# Patient Record
Sex: FEMALE | Race: WHITE
[De-identification: names, ages, dates, MRNs, and addresses within clinical notes are randomized per-mention and may not be internally consistent; named-entity substitution may affect disease eponyms.]

---

## 2018-08-04 ENCOUNTER — HOSPITAL ENCOUNTER (EMERGENCY)
Dept: HOSPITAL 25 - UCEAST | Age: 28
Discharge: HOME | End: 2018-08-04
Payer: COMMERCIAL

## 2018-08-04 VITALS — SYSTOLIC BLOOD PRESSURE: 110 MMHG | DIASTOLIC BLOOD PRESSURE: 69 MMHG

## 2018-08-04 DIAGNOSIS — E86.0: ICD-10-CM

## 2018-08-04 DIAGNOSIS — Z88.1: ICD-10-CM

## 2018-08-04 DIAGNOSIS — A04.5: ICD-10-CM

## 2018-08-04 DIAGNOSIS — R51: Primary | ICD-10-CM

## 2018-08-04 PROCEDURE — 87046 STOOL CULTR AEROBIC BACT EA: CPT

## 2018-08-04 PROCEDURE — 87651 STREP A DNA AMP PROBE: CPT

## 2018-08-04 PROCEDURE — 87086 URINE CULTURE/COLONY COUNT: CPT

## 2018-08-04 PROCEDURE — 96372 THER/PROPH/DIAG INJ SC/IM: CPT

## 2018-08-04 PROCEDURE — 87077 CULTURE AEROBIC IDENTIFY: CPT

## 2018-08-04 PROCEDURE — 87329 GIARDIA AG IA: CPT

## 2018-08-04 PROCEDURE — 87045 FECES CULTURE AEROBIC BACT: CPT

## 2018-08-04 PROCEDURE — G0463 HOSPITAL OUTPT CLINIC VISIT: HCPCS

## 2018-08-04 PROCEDURE — 99212 OFFICE O/P EST SF 10 MIN: CPT

## 2018-08-04 PROCEDURE — 81003 URINALYSIS AUTO W/O SCOPE: CPT

## 2018-08-04 PROCEDURE — 87328 CRYPTOSPORIDIUM AG IA: CPT

## 2018-08-04 PROCEDURE — 87899 AGENT NOS ASSAY W/OPTIC: CPT

## 2018-08-04 NOTE — UC
HPI Febrile Illness





- HPI Summary


HPI Summary: 


This is scribe Lydia Mccray documenting for attending Sukumar Dee M.D.





Pt is a 28 y/o F who presents to [facility] c/o fever since yesterday. Fever 

started yesterday after getting home from work, with her temperature 101 last 

night. Throughout the night her temperature reached 104, and this morning it 

was 102. She took 2 regular strength Motrin this morning at 0930 and on arrival 

to [facility], her temperature was 99.2. Additionally c/o HA last night and 

diarrhea and neck pain since this morning. Currently denies sore throat, noting 

one last week. Denies cough, neck stiffness, photophobia. Notes that last week 

she had a URI. Reports that she rarely experiences headaches, with no PMHx of 

migraines. 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 

sick calves and goats and that she may have been exposed to cryptosporidiosis. 








- History of Current Complaint


Chief Complaint: UCGeneralIllness


Time Seen by Provider: 08/04/18 10:14


Hx Obtained From: Patient


Hx Last Menstrual Period: 7/27/18


Onset/Duration: Started Hours Ago - Last night, Still Present


Current Severity: Moderate


Pain Intensity: 5 - Head


Pain Scale Used: 0-10 Numeric


Aggravating Factors: Nothing


Alleviating Factors: Nothing


Associated Signs and Symptoms: Diarrhea, Headache





- Allergy/Home Medications


Allergies/Adverse Reactions: 


 Allergies











Allergy/AdvReac Type Severity Reaction Status Date / Time


 


azithromycin [From Zithromax] Allergy  Rash Verified 08/04/18 10:08











Home Medications: 


 Home Medications





Iron 45 mg PO DAILY 08/04/18 [History Confirmed 08/04/18]


Vit D3/Folic Acid/B2/B6/B12 [Folgard Tablet] 1 tab PO DAILY 08/04/18 [History 

Confirmed 08/04/18]











PMH/Surg Hx/FS Hx/Imm Hx





- Additional Past Medical History


Additional PMH: 


PMHx: anemia


Negative PMHx: migraines





- Surgical History


Surgery Procedure, Year, and Place: knee surg





- Family History


Known Family History: Positive: Cardiac Disease, Hypertension, Diabetes





- Social History


Occupation: Student


Alcohol Use: None


Substance Use Type: None


Smoking Status (MU): Never Smoked Tobacco





Review of Systems


Constitutional: Fever


Skin: Negative


Eyes: Negative


ENT: Negative


Respiratory: Negative


Cardiovascular: Negative


Gastrointestinal: Diarrhea


Genitourinary: Negative


Motor: Negative


Neurovascular: Negative


Musculoskeletal: Other: - Neck pain


Neurological: Headache


Psychological: Negative


All Other Systems Reviewed And Are Negative: Yes





- Comments


Additional Review of Systems Comments: 


NEGATIVE: Cough, neck stiffness, photophobia 





Physical Exam





- Summary


Physical Exam Summary: 


VITAL SIGNS: Reviewed.


GENERAL: Patient is a well-developed and nourished female who is lying 

comfortable in the stretcher. Patient is not in any acute respiratory distress.


HEAD AND FACE: Normocephalic


EYES: PERRLA, EOMI x 2.


EARS: Hearing grossly intact.


MOUTH: Oropharynx within normal limits.


NECK: Supple, trachea is midline, no adenopathy, no JVD, no carotid bruit, no 

neck stiffness, and no meningeal signs


CHEST: Symmetric, no tenderness at palpation


LUNGS: Clear to auscultation bilaterally. No wheezing or crackles.


CVS: Regular rate and rhythm, S1 and S2 present, no murmurs or gallops 

appreciated.


ABDOMEN: Soft, non-tender. Bowel sounds are normal. No abdominal abnormal 

pulsations.


EXTREMITIES: Full ROM in all major joints, no edema, no cyanosis or clubbing.


NEURO: Alert and oriented x 3. No acute neurological deficits. Speech is normal 

and follows commands.


SKIN: Dry and warm


Triage Information Reviewed: Yes


Vital Signs: 


 Initial Vital Signs











Temp  99.3 F   08/04/18 10:04


 


Pulse  110   08/04/18 10:04


 


Resp  18   08/04/18 10:04


 


BP  110/69   08/04/18 10:04


 


Pulse Ox  100   08/04/18 10:04











Vital Signs Reviewed: Yes





Re-Evaluation





- Re-Evaluation


  ** First Eval


Re-Evaluation Time: 10:59


Comment: Discussed results and return precautions.





Course/Dx





- Course


Assessment/Plan: Patient is a 27-year-old female who presents to the urgent 

care with a chief complaint of having headache, fevers, and diarrhea.  Patient 

reports that she is a  student and she wants to be tested for 

cryptosporidium.  The patient doesn't have any neck pain, photophobia or stiff 

neck.  In the physical exam doesn't show any meningeal signs.  Urinalysis shows 

positive leukocytes and may be the cause of the fever.  Therefore the patient 

was placed on ciprofloxacin.  The patient was not able to give a stool sample 

in the urgent care therefore she was given the containers to bring back a stool 

sample.  She was given instructions that if she continues to have HA, fevers 

she should go to the emergency room for further workup and management and 

possibly rule out meningitis.  The patient is not ill looking or toxic looking 

therefore the patient will be discharged home with follow-up with PCP.  She is 

hemodynamically stable alert and oriented 3.  Cosleep





- Diagnoses


Clinic Provider Diagnoses: Headache, diarrhea, fever, dehydration, possible UTI





Discharge





- Sign-Out/Discharge


Documenting (check all that apply): Patient Departure - Discharge





- Discharge Plan


Condition: Stable


Disposition: HOME


Prescriptions: 


Ciprofloxacin TAB* [Cipro 250 MG Tab*] 250 mg PO BID #6 tab


Patient Education Materials:  Dehydration (ED), Urinary Tract Infection in 

Women (ED), Fever in Adults (ED), Acute Headache (ED), Acute Diarrhea (ED)


Referrals: 


[facility] PHYSICIAN REFERRAL [Outside]


No Primary Care Phys,NOPCP [Primary Care Provider] - 


Additional Instructions: 


RETURN TO ED OR URGENT CARE FOR ANY NEW OR WORSENING EXPOSURE.





- Billing Disposition and Condition


Condition: STABLE


Disposition: Home

## 2018-08-05 ENCOUNTER — HOSPITAL ENCOUNTER (EMERGENCY)
Dept: HOSPITAL 25 - ED | Age: 28
Discharge: HOME | End: 2018-08-05
Payer: COMMERCIAL

## 2018-08-05 VITALS — SYSTOLIC BLOOD PRESSURE: 99 MMHG | DIASTOLIC BLOOD PRESSURE: 69 MMHG

## 2018-08-05 DIAGNOSIS — N39.0: ICD-10-CM

## 2018-08-05 DIAGNOSIS — Z88.3: ICD-10-CM

## 2018-08-05 DIAGNOSIS — M79.1: Primary | ICD-10-CM

## 2018-08-05 PROCEDURE — 99282 EMERGENCY DEPT VISIT SF MDM: CPT

## 2018-08-05 PROCEDURE — 96361 HYDRATE IV INFUSION ADD-ON: CPT

## 2018-08-05 PROCEDURE — 96375 TX/PRO/DX INJ NEW DRUG ADDON: CPT

## 2018-08-05 PROCEDURE — 96365 THER/PROPH/DIAG IV INF INIT: CPT

## 2018-08-05 NOTE — ED
Neck Pain





- HPI Summary


HPI Summary: 





This is ana maria Ruiz documenting for attending Ronaldo Powers MD.


This patient is a 27 year old F presenting to [facility] c/o of neck stiffness that 

began today. Pt was seen at the  yesterday and dx with UTI and given Cipro 

for sx that began 8-3-18. They told her that if she develops neck stiffness to 

report to the ED. The patient rates the pain 4/10 in severity. Patient reports 

headache and suprapubic pain. Patient denies urinary sx. Pt states she did have 

a fever that resolved, she also had a headache for the last two days that 

resolved this morning. 








- History of Current Complaint


Chief Complaint: EDNeckComplaint


Stated Complaint: NECK PAIN


Time Seen by Provider: 08/05/18 14:53


Hx Obtained From: Patient


Onset/Duration Of Injury/Symptoms: Hours


Timing: Constant


Onset/Duration: Gradual Onset, Started hours ago, Still Present


Severity Initially: Mild


Severity Currently: Mild


Pain Intensity: 4


Pain Scale Used: 0-10 Numeric


Associated Signs & Symptoms: Positive: Negative - urinary sx., Fever - that 

resolved, Headache - that resolved





- Allergies/Home Medications


Allergies/Adverse Reactions: 


 Allergies











Allergy/AdvReac Type Severity Reaction Status Date / Time


 


azithromycin [From Zithromax] Allergy  Rash Verified 08/05/18 14:22











Home Medications: 


 Home Medications





Ciprofloxacin TAB* [Cipro 250 MG Tab*] 250 mg PO BID 08/05/18 [History 

Confirmed 08/05/18]


Cyanocobalamin TAB* [Vitamin B12 TAB*] 1,000 mcg PO DAILY 08/05/18 [History 

Confirmed 08/05/18]


Ibuprofen TAB* [Motrin TAB* 400 MG] 400 mg PO Q6H PRN 08/05/18 [History 

Confirmed 08/05/18]


Iron 45 mg PO DAILY 08/05/18 [History Confirmed 08/05/18]


Vitamin D3 5,000 unit PO DAILY 08/05/18 [History Confirmed 08/05/18]











PMH/Surg Hx/FS Hx/Imm Hx


Endocrine/Hematology History: 


   Denies: Hx Blood Disorders, Hx Diabetes, Hx Sickle Cell Disease


Cardiovascular History: 


   Denies: Hx Congenital Heart Disease, Hx Coronary Artery Disease


Respiratory History: 


   Denies: Hx Chronic Bronchitis, Hx Lung Cancer, Hx Pulmonary Embolism


GI History: 


   Denies: Hx Gall Bladder Disease, Hx Gastrointestinal Bleed


 History: Reports: Other  Problems/Disorders - UTI's 


Sensory History: Reports: Hx Contacts or Glasses


Opthamlomology History: Reports: Hx Contacts or Glasses


EENT History: 


   Denies: Hx Deafness


Neurological History: 


   Denies: Hx CVA, Hx Dementia


Infectious Disease History: No


Infectious Disease History: 


   Denies: Traveled Outside the [place] in Last 30 Days





- Family History


Known Family History: 


   Negative: Renal Disease, Respiratory Disease, Seizure Disorder





- Social History


Occupation: Student


Alcohol Use: None


Substance Use Type: Reports: None


Smoking Status (MU): Never Smoked Tobacco





Review of Systems


Positive: Fever - resolved 


Positive: Abdominal Pain


Positive: no symptoms reported


Positive: Other - neck pain 


Positive: Headache - resolved 


All Other Systems Reviewed And Are Negative: Yes





Physical Exam





- Summary


Physical Exam Summary: 








Appearance: Well appearing, no pain distress


Skin: warm, dry, reflects adequate perfusion


Head/face: normal


Eyes: EOMI, BIANCA


ENT: normal


Neck: supple, non-tender, no rigidity, no meningeal signs 


Respiratory: CTA, breath sounds present


Cardiovascular: RRR, pulses symmetrical 


Abdomen: non-tender, soft


Bowel Sounds: present


Musculoskeletal: normal, strength/ROM intact


Neuro: normal, sensory motor intact, A&Ox3 





Triage Information Reviewed: Yes


Vital Signs On Initial Exam: 


 Initial Vitals











Temp Pulse Resp BP Pulse Ox


 


 98.4 F   88   16   115/79   97 


 


 08/05/18 14:19  08/05/18 14:19  08/05/18 14:19  08/05/18 14:19  08/05/18 14:19











Vital Signs Reviewed: Yes





Diagnostics





- Vital Signs


 Vital Signs











  Temp Pulse Resp BP Pulse Ox


 


 08/05/18 14:42   89   112/68  99


 


 08/05/18 14:19  98.4 F  88  16  115/79  97














- Laboratory


Lab Statement: Any lab studies that have been ordered have been reviewed, and 

results considered in the medical decision making process.





Neck Course/Dx





- Course


Course Of Treatment: Patient was seen and treated for urinary tract infection 

yesterday and started on antibiotics.  She has no fever today but some minor 

neck discomfort.  There is no meningismus, rigidity.  There is negative Kernig 

and Brudzinski sign.  She ranges it freely and is very comfortable appearing.  

I did hydrate her, give Toradol and an additional gram of Rocephin for her 

likely urinary tract infection source of her discomfort.  She'll continue 

outpatient antibiotics and will follow-up with [facility].





- Diagnoses


Provider Diagnoses: 


 Myalgia, UTI (urinary tract infection)








Discharge





- Sign-Out/Discharge


Documenting (check all that apply): Patient Departure





- Discharge Plan


Condition: Improved


Disposition: ADMITTED TO [facility]


Patient Education Materials:  Urinary Tract Infection in Women (ED)


Referrals: 


[facility] [Provider Group]


Additional Instructions: 


Stay well-hydrated.  Continue antibiotics.  Return with fever, vomiting, back 

pain, new symptoms, worse or other concerns as discussed.





- Billing Disposition and Condition


Condition: IMPROVED


Disposition: Admitted to [facility]

## 2018-08-06 NOTE — UC
- Progress Note


Progress Note: 





Stool culture positive for Campylobacter - treatment is conservative and 

disease is often self limiting. If symptoms worsen or develops new symptoms - 

go to ED for potential severe infection.





Re-Evaluation





- Re-Evaluation


  ** First Eval


Re-Evaluation Time: 10:59


Comment: Discussed results and return precautions.





Discharge





- Sign-Out/Discharge


Documenting (check all that apply): Post-Discharge Follow Up





- Discharge Plan


Condition: Stable


Disposition: HOME


Prescriptions: 


Ciprofloxacin TAB* [Cipro 250 MG Tab*] 250 mg PO BID #6 tab


Patient Education Materials:  Dehydration (ED), Urinary Tract Infection in 

Women (ED), Fever in Adults (ED), Acute Headache (ED), Acute Diarrhea (ED)


Referrals: 


[facility] PHYSICIAN REFERRAL [Outside]


No Primary Care Phys,NOPCP [Primary Care Provider] - 


Additional Instructions: 


RETURN TO ED OR URGENT CARE FOR ANY NEW OR WORSENING EXPOSURE.





- Billing Disposition and Condition


Condition: STABLE


Disposition: Home

## 2018-08-10 ENCOUNTER — HOSPITAL ENCOUNTER (EMERGENCY)
Dept: HOSPITAL 25 - ED | Age: 28
Discharge: HOME | End: 2018-08-10
Payer: COMMERCIAL

## 2018-08-10 VITALS — SYSTOLIC BLOOD PRESSURE: 116 MMHG | DIASTOLIC BLOOD PRESSURE: 79 MMHG

## 2018-08-10 DIAGNOSIS — R51: Primary | ICD-10-CM

## 2018-08-10 DIAGNOSIS — R19.7: ICD-10-CM

## 2018-08-10 LAB
BASOPHILS # BLD AUTO: 0 10^3/UL (ref 0–0.2)
EOSINOPHIL # BLD AUTO: 0.1 10^3/UL (ref 0–0.6)
HCT VFR BLD AUTO: 35 % (ref 35–47)
HGB BLD-MCNC: 11.8 G/DL (ref 12–16)
LYMPHOCYTES # BLD AUTO: 2.4 10^3/UL (ref 1–4.8)
MCH RBC QN AUTO: 32 PG (ref 27–31)
MCHC RBC AUTO-ENTMCNC: 34 G/DL (ref 31–36)
MCV RBC AUTO: 94 FL (ref 80–97)
MONOCYTES # BLD AUTO: 0.6 10^3/UL (ref 0–0.8)
NEUTROPHILS # BLD AUTO: 3.8 10^3/UL (ref 1.5–7.7)
NRBC # BLD AUTO: 0 10^3/UL
NRBC BLD QL AUTO: 0.1
PLATELET # BLD AUTO: 245 10^3/UL (ref 150–450)
RBC # BLD AUTO: 3.72 10^6/UL (ref 4–5.4)
WBC # BLD AUTO: 7 10^3/UL (ref 3.5–10.8)

## 2018-08-10 PROCEDURE — 36415 COLL VENOUS BLD VENIPUNCTURE: CPT

## 2018-08-10 PROCEDURE — 96360 HYDRATION IV INFUSION INIT: CPT

## 2018-08-10 PROCEDURE — 86140 C-REACTIVE PROTEIN: CPT

## 2018-08-10 PROCEDURE — 99282 EMERGENCY DEPT VISIT SF MDM: CPT

## 2018-08-10 PROCEDURE — 80053 COMPREHEN METABOLIC PANEL: CPT

## 2018-08-10 PROCEDURE — 85025 COMPLETE CBC W/AUTO DIFF WBC: CPT

## 2018-08-10 PROCEDURE — 83605 ASSAY OF LACTIC ACID: CPT

## 2018-08-10 NOTE — ED
Headache





- HPI Summary


HPI Summary: 





Patient complains of diarrhea, intermittent headache, intermittent neck pain 1 

week.  Seen at urgent care, and this is second visit to the ED for same.  

Initially diagnosed with UTI at urgent care, given Cipro twice a day for 3 

days.  Patient is [employer] student and was concerned about possible exposure to 

cryptosporidium.  Stool cultures from second visit to the ED here negative for 

Shigella, cryptosporidium, Giardia.  Positive for Campylobacter.  Patient 

states diarrhea has been persistent and only in the a.m.  Headache has been 

intermittent, but constant today.  Patient does state that she has been having 

diarrhea in the morning and then is out of the heat all day long with minimal 

hydration.  Patient was glasses and last prescription check was over a year 

ago.  Denies fever, change in neck range of motion, vision change, AMS, cough, 

sore throat, CP, SOB, N/V/, abdominal pain, change in urine.  Medical history 

is none.  Headache has been improved with Tylenol, but not today.





- History Of Current Complaint


Chief Complaint: EDHeadache


Stated Complaint: HEADACHES


Time Seen by Provider: 08/10/18 18:48


Hx Obtained From: Patient


Hx Last Menstrual Period: 7/27/18


Onset/Duration: Gradual Onset


Currently Pain Is: Moderate


Timing: Constant, Hours


Character: Throbbing


Location of Headache: Other: - Top of head bilaterally


Aggravating Factor: Nothing


Allevating Factors: Medication


Associated Signs And Symptoms: Negative





- Allergies/Home Medications


Allergies/Adverse Reactions: 


 Allergies











Allergy/AdvReac Type Severity Reaction Status Date / Time


 


azithromycin [From Zithromax] Allergy  Rash Verified 08/06/18 07:22














PMH/Surg Hx/FS Hx/Imm Hx


Endocrine/Hematology History: 


   Denies: Hx Blood Disorders, Hx Diabetes, Hx Sickle Cell Disease


Cardiovascular History: 


   Denies: Hx Congenital Heart Disease, Hx Coronary Artery Disease


Respiratory History: 


   Denies: Hx Chronic Bronchitis, Hx Lung Cancer, Hx Pulmonary Embolism


GI History: 


   Denies: Hx Gall Bladder Disease, Hx Gastrointestinal Bleed


 History: Reports: Other  Problems/Disorders - UTI's 


Sensory History: Reports: Hx Contacts or Glasses


   Denies: Hx Deafness


Opthamlomology History: Reports: Hx Contacts or Glasses


Neurological History: 


   Denies: Hx CVA, Hx Dementia





- Surgical History


Surgery Procedure, Year, and Place: knee surg





- Immunization History


Immunizations Up to Date: Yes


Infectious Disease History: No


Infectious Disease History: 


   Denies: Traveled Outside the [place] in Last 30 Days





- Family History


Known Family History: Positive: Cardiac Disease, Hypertension, Diabetes


   Negative: Renal Disease, Respiratory Disease, Seizure Disorder, Blood 

Disorder





- Social History


Alcohol Use: None


Substance Use Type: Reports: None


Smoking Status (MU): Never Smoked Tobacco





Review of Systems


Constitutional: Negative


Eyes: Negative


ENT: Negative


Cardiovascular: Negative


Respiratory: Negative


Positive: Diarrhea


Genitourinary: Negative


Musculoskeletal: Negative


Skin: Negative


Positive: Headache


Psychological: Normal


All Other Systems Reviewed And Are Negative: Yes





Physical Exam





- Summary


Physical Exam Summary: 





Negative Kernig's, negative Brudzinski.  Patient states neck feels better when 

she brings chin down to chest.


Triage Information Reviewed: Yes


Vital Signs On Initial Exam: 


 Initial Vitals











Temp Pulse Resp BP Pulse Ox


 


 98.9 F   65   14   111/61   98 


 


 08/10/18 16:36  08/10/18 16:36  08/10/18 16:36  08/10/18 16:36  08/10/18 16:36











Vital Signs Reviewed: Yes


Appearance: Positive: Well-Appearing


Skin: Positive: Warm


Head/Face: Positive: Normal Head/Face Inspection


Eyes: Positive: Normal


Neck: Positive: Supple


Respiratory/Lung Sounds: Positive: Clear to Auscultation


Cardiovascular: Positive: Normal


Abdomen Description: Positive: Nontender


Musculoskeletal: Positive: Normal


Neurological: Positive: Normal


Psychiatric: Positive: Normal


AVPU Assessment: Alert





- [place] Coma Scale


Best Eye Response: 4 - Spontaneous


Best Motor Response: 6 - Obeys Commands


Best Verbal Response: 5 - Oriented


Coma Scale Total: 15





Diagnostics





- Vital Signs


 Vital Signs











  Temp Pulse Resp BP Pulse Ox


 


 08/10/18 16:36  98.9 F  65  14  111/61  98














- Laboratory


Lab Results: 


 Lab Results











  08/10/18 08/10/18 08/10/18 Range/Units





  19:03 19:06 19:06 


 


WBC   7.0   (3.5-10.8)  10^3/ul


 


RBC   3.72 L   (4.00-5.40)  10^6/ul


 


Hgb   11.8 L   (12.0-16.0)  g/dl


 


Hct   35   (35-47)  %


 


MCV   94   (80-97)  fL


 


MCH   32 H   (27-31)  pg


 


MCHC   34   (31-36)  g/dl


 


RDW   13   (10.5-15)  %


 


Plt Count   245   (150-450)  10^3/ul


 


MPV   7.9   (7.4-10.4)  um3


 


Neut % (Auto)   55.3   (38-83)  %


 


Lymph % (Auto)   34.6   (25-47)  %


 


Mono % (Auto)   8.1 H   (0-7)  %


 


Eos % (Auto)   1.3   (0-6)  %


 


Baso % (Auto)   0.7   (0-2)  %


 


Absolute Neuts (auto)   3.8   (1.5-7.7)  10^3/ul


 


Absolute Lymphs (auto)   2.4   (1.0-4.8)  10^3/ul


 


Absolute Monos (auto)   0.6   (0-0.8)  10^3/ul


 


Absolute Eos (auto)   0.1   (0-0.6)  10^3/ul


 


Absolute Basos (auto)   0   (0-0.2)  10^3/ul


 


Absolute Nucleated RBC   0   10^3/ul


 


Nucleated RBC %   0.1   


 


Sodium    141  (135-145)  mmol/L


 


Potassium    3.8  (3.5-5.0)  mmol/L


 


Chloride    107  (101-111)  mmol/L


 


Carbon Dioxide    28  (22-32)  mmol/L


 


Anion Gap    6  (2-11)  mmol/L


 


BUN    9  (6-24)  mg/dL


 


Creatinine    0.62  (0.51-0.95)  mg/dL


 


Est GFR ( Amer)    140.8  (>60)  


 


Est GFR (Non-Af Amer)    116.4  (>60)  


 


BUN/Creatinine Ratio    14.5  (8-20)  


 


Glucose    97  ()  mg/dL


 


Lactic Acid  0.6    (0.5-2.0)  mmol/L


 


Calcium    9.3  (8.6-10.3)  mg/dL


 


Total Bilirubin    0.20  (0.2-1.0)  mg/dL


 


AST    51 H  (13-39)  U/L


 


ALT    43  (7-52)  U/L


 


Alkaline Phosphatase    75  ()  U/L


 


C-Reactive Protein    4.74  (<8.01)  mg/L


 


Total Protein    6.4  (6.4-8.9)  g/dL


 


Albumin    4.0  (3.2-5.2)  g/dL


 


Globulin    2.4  (2-4)  g/dL


 


Albumin/Globulin Ratio    1.7  (1-3)  











Result Diagrams: 


 08/10/18 19:06





 08/10/18 19:06


Lab Statement: Any lab studies that have been ordered have been reviewed, and 

results considered in the medical decision making process.





Headache Course/Dx





- Course


Course Of Treatment: Patient complains of diarrhea, intermittent headache, 

intermittent neck pain 1 week.  Seen at urgent care, and this is second visit 

to the ED for same.  Initially diagnosed with UTI at urgent care, given Cipro 

twice a day for 3 days.  Patient is [employer] student and was concerned about 

possible exposure to cryptosporidium.  Stool cultures from second visit to the 

ED here negative for Shigella, cryptosporidium, Giardia.  Positive for 

Campylobacter.  Patient states diarrhea has been persistent and only in the 

a.m.  Headache has been intermittent, but constant today.  Patient does state 

that she has been having diarrhea in the morning and then is out of the heat 

all day long with minimal hydration.  Patient was glasses and last prescription 

check was over a year ago.  Denies fever, change in neck range of motion, 

vision change, AMS, cough, sore throat, CP, SOB, N/V/, abdominal pain, change 

in urine.  Medical history is none.  Headache has been improved with Tylenol, 

but not today.  Physical exam:Negative Kernig's, negative Brudzinski.  Patient 

states neck feels better when she brings chin down to chest.  Vital signs 

within normal limits and stable.  Labs unremarkable.  Patient states headache 

improved from 6/10-2 to 2/10 with only 1 L of normal saline.  Patient is 

finished with job requiring her to be out in the heat all day with no time to 

hydrate.  Amputation states she will be able to better hydrate follow today.  

also states diarrhea is less watery than before.  patient will improve her 

hydration status, continue the bland diet and will return for any new or 

concerning symptoms.  patient understands and approves of plan





- Diagnoses


Provider Diagnoses: 


 Headache, Diarrhea








Discharge





- Sign-Out/Discharge


Documenting (check all that apply): Patient Departure





- Discharge Plan


Condition: Stable


Disposition: HOME


Patient Education Materials:  Acute Headache (ED), Acute Diarrhea (ED)


Referrals: 


No Primary Care Phys,NOPCP [Primary Care Provider] - 


Additional Instructions: 


Maintain hydration.  Continue to follow bland diet.  Follow-up with primary 

care.  Return to the ED for any new or worsening symptoms





- Billing Disposition and Condition


Condition: STABLE


Disposition: Home